# Patient Record
Sex: FEMALE | Race: WHITE | NOT HISPANIC OR LATINO | Employment: FULL TIME | URBAN - METROPOLITAN AREA
[De-identification: names, ages, dates, MRNs, and addresses within clinical notes are randomized per-mention and may not be internally consistent; named-entity substitution may affect disease eponyms.]

---

## 2017-06-30 ENCOUNTER — ALLSCRIPTS OFFICE VISIT (OUTPATIENT)
Dept: OTHER | Facility: OTHER | Age: 47
End: 2017-06-30

## 2017-06-30 DIAGNOSIS — Z12.31 ENCOUNTER FOR SCREENING MAMMOGRAM FOR MALIGNANT NEOPLASM OF BREAST: ICD-10-CM

## 2017-10-18 ENCOUNTER — HOSPITAL ENCOUNTER (OUTPATIENT)
Dept: MAMMOGRAPHY | Facility: MEDICAL CENTER | Age: 47
Discharge: HOME | End: 2017-10-18
Payer: COMMERCIAL

## 2017-10-18 DIAGNOSIS — Z12.31 ENCOUNTER FOR SCREENING MAMMOGRAM FOR MALIGNANT NEOPLASM OF BREAST: ICD-10-CM

## 2017-10-18 PROCEDURE — G0202 SCR MAMMO BI INCL CAD: HCPCS

## 2017-12-22 ENCOUNTER — ALLSCRIPTS OFFICE VISIT (OUTPATIENT)
Dept: OTHER | Facility: OTHER | Age: 47
End: 2017-12-22

## 2017-12-23 NOTE — PROGRESS NOTES
Assessment   1  Monilial vulvovaginitis (112 1) (B37 3)   2  Vulvar cyst (624 8) (N90 7)    Plan   Monilial vulvovaginitis    · Nystatin [id number] UNIT/GM External Powder; APPLY 2-3 TIMES DAILY TO AFFECTED    AREA(S)   Rx By: Shay Berman; Dispense: 0 Days ; #:1 X 60 GM Bottle; Refill: 1;For: Monilial vulvovaginitis; EPIFANIO = N; Sent To: [id number] Vulvar cyst    · I&D Vulva/Perineal Absces - POC; Status:Complete;   Done: [id number]   Perform: In Office; [id number]; Ordered; For:Vulvar cyst; Ordered By:Khadra Castorena;    Discussion/Summary   Discussion Summary:    Vulvar sebaceous cyst evacuated for patient comfort in 2 weeks if no improvement with nystatin powder for vulvar yeast infection  Goals and Barriers: The patient has the current Goals: Resolution of sx  The patent has the current Barriers: None  Patient's Capacity to Self-Care: Patient is able to Self-Care  Chief Complaint   Chief Complaint Free Text Note Form: I have a lump      History of Present Illness   HPI: Pt is a 41 y o   with LMP 2017 who presents complaining of a lump on her left labia  She reports it started 1 week ago and she has been using warm compresses and has not shaved for 1 week b/c she thought she had an ingrown hair  She reports the lump is larger than when it first started  She reports it was pea size and now it is the size of a dime  She reports it feels hard and not fluctuant  She denies any drainage from it  She denies fevers/chills  SHe reports it is painful to wear pants b/c it rubs against her pants  She reports it is painful to sit  She reports she has not needed anything for pain  SHe has not been sexually active for 2 weeks  SHe is afraid that intercourse will be painful  She also reports that she also noticed that her perineum is very itchy as well   She denies any abnormal vaginal discharge      Review of Systems   Focused-Female:      Constitutional: No fever, no chills, feels well, no tiredness, no recent weight gain or loss-- and-- as noted in HPI       ENT: no ear ache, no loss of hearing, no nosebleeds or nasal discharge, no sore throat or hoarseness  Cardiovascular: no complaints of slow or fast heart rate, no chest pain, no palpitations, no leg claudication or lower extremity edema  Respiratory: no complaints of shortness of breath, no wheezing, no dyspnea on exertion, no orthopnea or PND  Breasts: no complaints of breast pain, breast lump or nipple discharge  Gastrointestinal: no complaints of abdominal pain, no constipation, no nausea or diarrhea, no vomiting, no bloody stools,-- no nausea,-- no vomiting,-- no constipation-- and-- no diarrhea  Genitourinary: as noted in HPI,-- no dysuria,-- no vaginal discharge,-- no incontinence,-- no dysmenorrhea-- and-- no unexplained vaginal bleeding  Musculoskeletal: no complaints of arthralgia, no myalgia, no joint swelling or stiffness, no limb pain or swelling  Integumentary: as noted in HPI  Neurological: no complaints of headache, no confusion, no numbness or tingling, no dizziness or fainting  Active Problems   1  Lack of exercise (V69 0) (Z72 3)   2  Morbid obesity (278 01) (E66 01)   3  Visit for screening mammogram (V76 12) (Z12 31)   4  Well female exam with routine gynecological exam (V72 31) (Z01 419)    Past Medical History   1  Denied: History of Herpes simplex   2  History of abnormal cervical Pap smear (V13 29) (V82 254)   3  History of lump of right breast (V13 89) (Z87 898)   4  History of Papanicolaou smear (V45 89) (Z98 890)   5  History of pregnancy (V13 29)   6  History of Irritable bowel syndrome (IBS) (564 1) (K58 9)   7  History of Menorrhagia with irregular cycle (626 2) (N92 1)   8  History of Varicella (052 9) (B01 9)  Active Problems And Past Medical History Reviewed: The active problems and past medical history were reviewed and updated today  Surgical History   1   History of Cervix Cryosurgery   2  History of Oral Surgery Tooth Extraction  Surgical History Reviewed: The surgical history was reviewed and updated today  Family History   Mother    1  Family history of breast cancer (V16 3) (Z80 3)   2  Family history of Hypothyroidism  Father    3  Family history of malignant neoplasm of urinary bladder (V16 52) (Z80 52)   4  Family history of Hypercholesterolemia   5  Family history of Prostate cancer   6  Family history of Thyroid disorder  Maternal Grandmother    7  Family history of Cardiac ischemia   8  Family history of    5  Family history of Hypertension   10  Family history of Stroke  Paternal Grandmother    6  Family history of Cervical cancer   12  Family history of    15  Family history of Liver cancer  Maternal Aunt    15  Family history of Type 2 diabetes mellitus  Family History Reviewed: The family history was reviewed and updated today  Social History    · Alcohol use   · Always uses seat belt   · Education history   · Inadequate exercise (V69 0) (Z72 3)   · Lack of exercise (V69 0) (Z72 3)   ·    · Never a smoker   · No drug use   · No preference on Gnosticism beliefs   · Occupation   · One child   · Primary spoken language English   · Racial background  Social History Reviewed: The social history was reviewed and updated today  The social history was reviewed and is unchanged  Current Meds    1  Multi-Vitamin Oral Tablet; Therapy: (Recorded:[id number]) to Recorded  Medication List Reviewed: The medication list was reviewed and updated today  Allergies   1  Macrobid   2  Penicillins    Vitals   Vital Signs    Recorded: [id number] 34:38OI   Systolic 305   Diastolic 78   Height 5 ft 2 76 in   Weight 203 lb    BMI Calculated 36 24   BSA Calculated 1 94   LMP [id number]     Physical Exam        Constitutional      General appearance: Abnormal   morbidly obese  Neck      Neck: Normal, supple, trachea midline, no masses  Pulmonary      Respiratory effort: No increased work of breathing or signs of respiratory distress  Genitourinary      External genitalia: Abnormal  -- (right labia minora enlarged with sebaceous cyst--cyst evacuated) There was erythema of the labia majora  Examination of the external genitalia showed vulvitis  There was no erythema, no ecchymosis and no swelling of the labia minora  Both Bartholin's glands were normal  The clitoris was normal       Vagina: Normal, no lesions or dryness appreciated  Urethra: Normal        Urethral meatus: Normal        Bladder: Normal, soft, non-tender and no prolapse or masses appreciated  Cervix: Normal, no palpable masses  Examination of the cervix revealed normal findings,-- no polyps-- and-- no masses  A Pap smear was not performed  Uterus: Normal, non-tender, not enlarged, and no palpable masses  Adnexa/parametria: Normal, non-tender and no fullness or masses appreciated  Abdomen      Abdomen: Normal, non-tender, and no organomegaly noted  Liver and spleen: No hepatomegaly or splenomegaly  Examination for hernias: No hernias appreciated  Skin      Skin and subcutaneous tissue: Normal skin turgor and no rashes         Psychiatric      Orientation to person, place, and time: Normal        Mood and affect: Normal        Signatures    Electronically signed by : LUISITO Bingham ; Dec 22 2017  8:58AM EST                       (Author)

## 2018-01-13 VITALS
HEIGHT: 63 IN | WEIGHT: 199 LBS | DIASTOLIC BLOOD PRESSURE: 70 MMHG | BODY MASS INDEX: 35.26 KG/M2 | SYSTOLIC BLOOD PRESSURE: 110 MMHG

## 2018-01-23 VITALS
WEIGHT: 203 LBS | BODY MASS INDEX: 35.97 KG/M2 | DIASTOLIC BLOOD PRESSURE: 78 MMHG | HEIGHT: 63 IN | SYSTOLIC BLOOD PRESSURE: 124 MMHG

## 2019-01-28 ENCOUNTER — ANNUAL EXAM (OUTPATIENT)
Dept: OBGYN CLINIC | Facility: CLINIC | Age: 49
End: 2019-01-28
Payer: COMMERCIAL

## 2019-01-28 VITALS
BODY MASS INDEX: 35.58 KG/M2 | DIASTOLIC BLOOD PRESSURE: 70 MMHG | SYSTOLIC BLOOD PRESSURE: 124 MMHG | HEIGHT: 63 IN | WEIGHT: 200.8 LBS

## 2019-01-28 DIAGNOSIS — Z12.31 ENCOUNTER FOR SCREENING MAMMOGRAM FOR BREAST CANCER: ICD-10-CM

## 2019-01-28 DIAGNOSIS — Z01.419 ENCOUNTER FOR ANNUAL ROUTINE GYNECOLOGICAL EXAMINATION: Primary | ICD-10-CM

## 2019-01-28 DIAGNOSIS — E66.9 OBESITY, CLASS II, BMI 35-39.9: ICD-10-CM

## 2019-01-28 DIAGNOSIS — Z12.4 PAP SMEAR FOR CERVICAL CANCER SCREENING: ICD-10-CM

## 2019-01-28 PROBLEM — E66.812 OBESITY, CLASS II, BMI 35-39.9: Status: ACTIVE | Noted: 2019-01-28

## 2019-01-28 PROCEDURE — S0612 ANNUAL GYNECOLOGICAL EXAMINA: HCPCS | Performed by: OBSTETRICS & GYNECOLOGY

## 2019-01-28 RX ORDER — DIPHENOXYLATE HYDROCHLORIDE AND ATROPINE SULFATE 2.5; .025 MG/1; MG/1
1 TABLET ORAL
COMMUNITY
End: 2021-09-08

## 2019-01-28 RX ORDER — UBIDECARENONE 75 MG
CAPSULE ORAL DAILY
COMMUNITY
End: 2020-11-11

## 2019-01-28 RX ORDER — OMEGA-3S/DHA/EPA/FISH OIL/D3 300MG-1000
400 CAPSULE ORAL DAILY
COMMUNITY
End: 2021-09-08

## 2019-01-28 NOTE — PROGRESS NOTES
Pt is a 57 y o  No obstetric history on file  with Patient's last menstrual period was 2018 (exact date)  using VL for [facility] presents for preventive care  She notes the same partner since her last STI evaluation  In her lifetime she has been involved with 5   Safe sexual practices (monogomy, condoms) are followed consistently  · She does  feel safe in the relationship  She does feel safe in her home  · Her calcium intake encompasses milk (cow, goat, almond, cashew, soy, etc), cheese and yogurt for a total of 5-6 servings daily on average  She does take additional Vitamin D (MVI or supplement)  · She exercises 6-7 times per week  · Her menses occur every 21-60  Days, last 4-6 days and require super tampons every 4 hours for 3 days and then lighter hours  Menstrual History:  OB History      Para Term  AB Living    1 1   1   1    SAB TAB Ectopic Multiple Live Births                     Obstetric Comments    35 week , PPROM    Menarche: 14    Menses: 21-60/4-6/super tampon every 4 hours for 3 days then lighter         Menarche age: 17  Patient's last menstrual period was 2018 (exact date)  ·      · She has never recieved an HPV vaccine    · tobacco use : does not use tobacco              · Colonoscopy: 2019--repeat   · Last pap: 3/2016--wnl, repeat today  · Mammogram: --wnl, rx given    Past Medical History:   Diagnosis Date    Abnormal Pap smear of cervix     ; wnl since; 3/2016-wnl, HPV neg; 2019    Asthma     HPV (human papilloma virus) infection     Migraine     Varicella        Past Surgical History:   Procedure Laterality Date    COLONOSCOPY      2019-benign polyps; 2024    WISDOM TOOTH EXTRACTION         OB History    Para Term  AB Living   1 1   1   1   SAB TAB Ectopic Multiple Live Births                  # Outcome Date GA Lbr Dinesh/2nd Weight Sex Delivery Anes PTL Lv   1                Obstetric Comments   35 week , PPROM      Menarche: 14      Menses: 21-60/4-6/super tampon every 4 hours for 3 days then lighter       Gyn HX:  abnormal pap       Current Outpatient Prescriptions:     cholecalciferol (VITAMIN D3) 400 units tablet, Take 400 Units by mouth daily, Disp: , Rfl:     cyanocobalamin (VITAMIN B-12) 100 mcg tablet, Take by mouth daily, Disp: , Rfl:     multivitamin (THERAGRAN) TABS, Take 1 tablet by mouth, Disp: , Rfl:     albuterol (PROVENTIL HFA,VENTOLIN HFA) 90 mcg/act inhaler, Inhale 2 puffs every 4 (four) hours as needed, Disp: , Rfl:     Allergies   Allergen Reactions    Nitrofurantoin Nausea Only, Vomiting and Hives     N/V    Pollen Extract     Penicillins Rash       Social History     Social History    Marital status: /Civil Union     Spouse name: Jim Rich Number of children: 1    Years of education: bachelors     Occupational History    Transportation       Social History Main Topics    Smoking status: Never Smoker    Smokeless tobacco: Never Used    Alcohol use 1 2 oz/week     2 Glasses of wine per week    Drug use: No    Sexual activity: Yes     Partners: Male     Birth control/ protection: Male Sterilization      Comment: lifetime partners: 5;  5 years     Other Topics Concern    None     Social History Narrative    Catholic: No preference    Accepts blood products        Exercise: walking daily x 30 min    Calcium: 2 c milk daily, 2 cheese daily, 1 yogurt 4x/week           Family History   Problem Relation Age of Onset    Breast cancer Mother 72    Obesity Mother     Prostate cancer Father     Cancer Father         bladder    Cervical cancer Paternal Grandmother     Lung cancer Paternal Grandfather     Ovarian cancer Neg Hx     Colon cancer Neg Hx        Blood pressure 124/70, height 5' 3" (1 6 m), weight 91 1 kg (200 lb 12 8 oz), last menstrual period 2018  and Body mass index is 35 57 kg/m²      Physical Exam   Constitutional: She is oriented to person, place, and time  She appears well-developed and well-nourished  HENT:   Head: Normocephalic and atraumatic  Eyes: Conjunctivae and EOM are normal    Neck: Normal range of motion  Neck supple  No tracheal deviation present  No thyromegaly present  Cardiovascular: Normal rate, regular rhythm and normal heart sounds  Pulmonary/Chest: Effort normal and breath sounds normal  No stridor  No respiratory distress  She has no wheezes  She has no rales  Abdominal: Soft  Bowel sounds are normal  She exhibits no distension and no mass  There is no tenderness  There is no rebound and no guarding  Musculoskeletal: Normal range of motion  She exhibits no edema or tenderness  Lymphadenopathy:     She has no cervical adenopathy  Neurological: She is alert and oriented to person, place, and time  Skin: Skin is warm  No rash noted  No erythema  Psychiatric: She has a normal mood and affect  Her behavior is normal  Judgment and thought content normal      Breasts: breasts appear normal, no suspicious masses, no skin or nipple changes or axillary nodes, symmetric fibrous changes in both upper outer quadrants  vulva: normal external genitalia for age and no lesions, masses, epithelial changes, or exudate  vagina: color pink, rugae  well formed rugae and discharge  white  cervix: parous and no lesions   uterus: NSSC, AF, NT, mobile  adnexa: no masses or tenderness  rectum: no masses or nodularity      A/P:  Pt is a 57 y o  No obstetric history on file  with      Diagnoses and all orders for this visit:    Encounter for annual routine gynecological examination    Encounter for screening mammogram for breast cancer  -     Mammo screening bilateral w cad; Future    Pap smear for cervical cancer screening  -     Thinprep Tis Pap and HPV mRNA E6/E7 Reflex HPV 16,18/45    Obesity, Class II, BMI 35-39 9    Patient advised recommendation of daily dietary calcium of  1000 mg calcium     Patient advised recommendation of exercise 5 times per week for 30 minutes  Patient advised recommendation of BMI to be between 19-25      Pt up to date on colonoscopy

## 2019-03-11 ENCOUNTER — HOSPITAL ENCOUNTER (OUTPATIENT)
Dept: MAMMOGRAPHY | Facility: MEDICAL CENTER | Age: 49
Discharge: HOME | End: 2019-03-11
Payer: COMMERCIAL

## 2019-03-11 DIAGNOSIS — Z12.31 ENCOUNTER FOR SCREENING MAMMOGRAM FOR BREAST CANCER: ICD-10-CM

## 2019-03-11 PROCEDURE — 77067 SCR MAMMO BI INCL CAD: CPT

## 2020-08-13 ENCOUNTER — ANNUAL EXAM (OUTPATIENT)
Dept: OBGYN CLINIC | Facility: CLINIC | Age: 50
End: 2020-08-13
Payer: COMMERCIAL

## 2020-08-13 VITALS
SYSTOLIC BLOOD PRESSURE: 118 MMHG | BODY MASS INDEX: 33.9 KG/M2 | DIASTOLIC BLOOD PRESSURE: 78 MMHG | TEMPERATURE: 97.8 F | HEIGHT: 64 IN | WEIGHT: 198.6 LBS

## 2020-08-13 DIAGNOSIS — Z12.31 ENCOUNTER FOR SCREENING MAMMOGRAM FOR BREAST CANCER: ICD-10-CM

## 2020-08-13 DIAGNOSIS — Z72.3 INADEQUATE EXERCISE: ICD-10-CM

## 2020-08-13 DIAGNOSIS — Z12.11 COLON CANCER SCREENING: ICD-10-CM

## 2020-08-13 DIAGNOSIS — Z01.419 ENCOUNTER FOR ANNUAL ROUTINE GYNECOLOGICAL EXAMINATION: Primary | ICD-10-CM

## 2020-08-13 DIAGNOSIS — N93.9 ABNORMAL UTERINE BLEEDING: ICD-10-CM

## 2020-08-13 PROCEDURE — S0612 ANNUAL GYNECOLOGICAL EXAMINA: HCPCS | Performed by: OBSTETRICS & GYNECOLOGY

## 2020-08-13 NOTE — PROGRESS NOTES
Pt is a 52 y o   with Patient's last menstrual period was 07/10/2020 (exact date)  using VL for [facility] presents for preventive care  She notes the same partner since her last STI evaluation  In her lifetime she has been involved with 5   Safe sexual practices (monogomy, condoms) are followed consistently  · She does  feel safe in the relationship  She does feel safe in her home  · Her calcium intake encompasses milk (cow, goat, almond, cashew, soy, etc), cheese and yogurt for a total of 5-6 servings daily on average  She does take additional Vitamin D (MVI or supplement)  · She exercises 3-4 times per week  · Her menses occur every 14-60  Days, last 6-7 days and require super tampons every 4-8 hours  Menstrual History:  OB History        1    Para   1    Term   0       1    AB        Living   1       SAB        TAB        Ectopic        Multiple        Live Births               Obstetric Comments   35 week , PPROM    Menarche: 14    Menses: 14-60/7/super tampon every 4 hours for 3 days then lighter - unpredictable flow            Menarche age: 17  Patient's last menstrual period was 07/10/2020 (exact date)  ·      · She has never recieved an HPV vaccine  · tobacco use : does not use tobacco              · Colonoscopy: 2019-repeat , hemoccult rx given  · Mammogram: 3/2019-wnl, repeat rx given  · Pap: 2019-wnl, HRHPV neg, repeat   · Reviewed with patient that her bleeding profile is concerning now that she has cycles as short as 14 days recommend workup including labs, u/s and eb  Pt agrees       Past Medical History:   Diagnosis Date    Abnormal Pap smear of cervix     ; wnl since; 3/2016-wnl, HPV neg; 2019 wnl, HPV neg    Asthma     HPV (human papilloma virus) infection     Migraine     Varicella        Past Surgical History:   Procedure Laterality Date    COLONOSCOPY      2019-benign polyps; 2024    WISDOM TOOTH EXTRACTION         OB History    Para Term  AB Living   1 1 0 1   1   SAB TAB Ectopic Multiple Live Births                  # Outcome Date GA Lbr Dinesh/2nd Weight Sex Delivery Anes PTL Lv   1                Obstetric Comments   35 week , PPROM      Menarche: 14      Menses: 14-60/7/super tampon every 4 hours for 3 days then lighter - unpredictable flow            Current Outpatient Medications:     albuterol (PROVENTIL HFA,VENTOLIN HFA) 90 mcg/act inhaler, Inhale 2 puffs every 4 (four) hours as needed, Disp: , Rfl:     cholecalciferol (VITAMIN D3) 400 units tablet, Take 400 Units by mouth daily, Disp: , Rfl:     cyanocobalamin (VITAMIN B-12) 100 mcg tablet, Take by mouth daily, Disp: , Rfl:     multivitamin (THERAGRAN) TABS, Take 1 tablet by mouth, Disp: , Rfl:     Allergies   Allergen Reactions    Nitrofurantoin Nausea Only, Vomiting and Hives     N/V    Pollen Extract     Penicillins Rash       Social History     Socioeconomic History    Marital status: /Civil Union     Spouse name: Kami Logn Number of children: 1    Years of education: bachelors    Highest education level: None   Occupational History    Occupation: Transportation    Social Needs    Financial resource strain: None    Food insecurity     Worry: None     Inability: None    Transportation needs     Medical: None     Non-medical: None   Tobacco Use    Smoking status: Never Smoker    Smokeless tobacco: Never Used   Substance and Sexual Activity    Alcohol use:  Yes     Alcohol/week: 2 0 standard drinks     Types: 2 Glasses of wine per week     Frequency: 2-3 times a week     Drinks per session: 1 or 2    Drug use: No    Sexual activity: Yes     Partners: Male     Birth control/protection: Male Sterilization     Comment: lifetime partners: 5;  5 years   Lifestyle    Physical activity     Days per week: None     Minutes per session: None    Stress: None   Relationships    Social connections     Talks on phone: None     Gets together: None     Attends Shinto service: None     Active member of club or organization: None     Attends meetings of clubs or organizations: None     Relationship status: None    Intimate partner violence     Fear of current or ex partner: None     Emotionally abused: None     Physically abused: None     Forced sexual activity: None   Other Topics Concern    None   Social History Narrative    Taoism: No preference    Accepts blood products        Exercise: 3x/week x 30 min    Calcium: multivitamin, 2 c milk daily, 2 cheese daily, 1 yogurt 4x/week       Family History   Problem Relation Age of Onset    Breast cancer Mother 72    Obesity Mother     Prostate cancer Father 62    Cancer Father 64        bladder    Heart disease Maternal Grandmother     Diabetes Maternal Grandmother     Heart disease Maternal Grandfather     Cervical cancer Paternal Grandmother         age at onset unknown    Ovarian cancer Paternal Grandmother         age at onset unknown,  29?  Lung cancer Paternal Grandfather [de-identified]    Colon cancer Neg Hx        Blood pressure 118/78, temperature 97 8 °F (36 6 °C), height 5' 3 5" (1 613 m), weight 90 1 kg (198 lb 9 6 oz), last menstrual period 07/10/2020  and Body mass index is 34 63 kg/m²  Physical Exam  Constitutional:       Appearance: She is well-developed  HENT:      Head: Normocephalic and atraumatic  Eyes:      Conjunctiva/sclera: Conjunctivae normal    Neck:      Musculoskeletal: Normal range of motion and neck supple  Thyroid: No thyromegaly  Trachea: No tracheal deviation  Cardiovascular:      Rate and Rhythm: Normal rate and regular rhythm  Heart sounds: Normal heart sounds  Pulmonary:      Effort: Pulmonary effort is normal  No respiratory distress  Breath sounds: Normal breath sounds  No stridor  No wheezing or rales  Abdominal:      General: Bowel sounds are normal  There is no distension        Palpations: Abdomen is soft  There is no mass  Tenderness: There is no abdominal tenderness  There is no guarding or rebound  Musculoskeletal: Normal range of motion  General: No tenderness  Lymphadenopathy:      Cervical: No cervical adenopathy  Skin:     General: Skin is warm  Findings: No erythema or rash  Neurological:      Mental Status: She is alert and oriented to person, place, and time  Psychiatric:         Behavior: Behavior normal          Thought Content: Thought content normal          Judgment: Judgment normal          Breasts: breasts appear normal, no suspicious masses, no skin or nipple changes or axillary nodes, symmetric fibrous changes in both upper outer quadrants  vulva: normal external genitalia for age and no lesions, masses, epithelial changes, or exudate  vagina: color pink and rugae  well formed rugae  cervix: parous and no lesions   uterus: NSSC, AF, NT, mobile  adnexa: no masses or tenderness  rectum: no masses or nodularity      A/P:  Pt is a 52 y o  [id number] with      Diagnoses and all orders for this visit:    Encounter for annual routine gynecological examination  -pap up to date    Encounter for screening mammogram for breast cancer  -     Mammo screening bilateral w cad; Future    Colon cancer screening  -     Occult Blood, Fecal Immunochemical; Future    Abnormal uterine bleeding  -     TSH, 3rd generation with Free T4 reflex; Future  -     CBC; Future  -     Follicle stimulating hormone; Future  -     Luteinizing hormone; Future  -     Estradiol; Future  -     Prolactin; Future  -     US pelvis complete w transvaginal; Future  -will follow up for EBx    BMI 34 0-34 9,adult  Patient advised recommendation of BMI to be between 19-25  Inadequate exercise  Patient advised recommendation of exercise 5 times per week for 30 minutes

## 2020-09-29 ENCOUNTER — PROCEDURE VISIT (OUTPATIENT)
Dept: OBGYN CLINIC | Facility: CLINIC | Age: 50
End: 2020-09-29
Payer: COMMERCIAL

## 2020-09-29 VITALS
TEMPERATURE: 97.5 F | BODY MASS INDEX: 33.09 KG/M2 | WEIGHT: 193.8 LBS | OXYGEN SATURATION: 98 % | SYSTOLIC BLOOD PRESSURE: 112 MMHG | DIASTOLIC BLOOD PRESSURE: 72 MMHG | HEART RATE: 83 BPM | HEIGHT: 64 IN

## 2020-09-29 DIAGNOSIS — N93.9 ABNORMAL UTERINE BLEEDING: Primary | ICD-10-CM

## 2020-09-29 PROCEDURE — 58100 BIOPSY OF UTERUS LINING: CPT | Performed by: OBSTETRICS & GYNECOLOGY

## 2020-09-29 NOTE — PROGRESS NOTES
Endometrial biopsy    Date/Time: 9/29/2020 12:48 PM  Performed by: Brie Dow MD  Authorized by: Brie Dow MD     Consent:     Consent obtained:  Written    Consent given by:  Patient    Procedural risks discussed:  Bleeding, failure rate, infection, possible continued pain, repeat procedure and damage to other organs    Patient questions answered: yes      Patient agrees, verbalizes understanding, and wants to proceed: yes      Educational handouts given: yes      Instructions and paperwork completed: yes    Indication:     Indications:  Other disorder of menstruation and other abnormal bleeding from female genital tract    Procedure:     Procedure: endometrial biopsy with Pipelle      A bivalve speculum was placed in the vagina: yes      Cervix cleaned and prepped: yes      A paracervical block was performed: no      An intracervical block was performed: no      The cervix was dilated: no      Uterus sounded: yes      Uterus sound depth (cm):  8 5    Specimen collected: specimen collected and sent to pathology      Patient tolerated procedure well with no complications: yes    Findings:     Uterus size:  Non-gravid    Cervix: normal

## 2020-10-07 ENCOUNTER — HOSPITAL ENCOUNTER (OUTPATIENT)
Dept: ULTRASOUND IMAGING | Facility: HOSPITAL | Age: 50
Discharge: HOME | End: 2020-10-07
Payer: COMMERCIAL

## 2020-10-07 ENCOUNTER — APPOINTMENT (OUTPATIENT)
Dept: MAMMOGRAPHY | Facility: HOSPITAL | Age: 50
End: 2020-10-07
Payer: COMMERCIAL

## 2020-10-07 ENCOUNTER — HOSPITAL ENCOUNTER (OUTPATIENT)
Dept: MAMMOGRAPHY | Facility: HOSPITAL | Age: 50
Discharge: HOME | End: 2020-10-07
Payer: COMMERCIAL

## 2020-10-07 VITALS — BODY MASS INDEX: 34.2 KG/M2 | WEIGHT: 193 LBS | HEIGHT: 63 IN

## 2020-10-07 DIAGNOSIS — N93.9 ABNORMAL UTERINE BLEEDING: ICD-10-CM

## 2020-10-07 DIAGNOSIS — Z12.31 ENCOUNTER FOR SCREENING MAMMOGRAM FOR BREAST CANCER: ICD-10-CM

## 2020-10-07 PROCEDURE — 77063 BREAST TOMOSYNTHESIS BI: CPT

## 2020-10-07 PROCEDURE — 77067 SCR MAMMO BI INCL CAD: CPT

## 2020-10-07 PROCEDURE — 76856 US EXAM PELVIC COMPLETE: CPT

## 2020-10-07 PROCEDURE — 76830 TRANSVAGINAL US NON-OB: CPT

## 2020-11-11 ENCOUNTER — OFFICE VISIT (OUTPATIENT)
Dept: OBGYN CLINIC | Facility: CLINIC | Age: 50
End: 2020-11-11
Payer: COMMERCIAL

## 2020-11-11 VITALS
TEMPERATURE: 98.3 F | WEIGHT: 198.4 LBS | DIASTOLIC BLOOD PRESSURE: 84 MMHG | HEART RATE: 84 BPM | SYSTOLIC BLOOD PRESSURE: 112 MMHG | OXYGEN SATURATION: 97 % | BODY MASS INDEX: 35.14 KG/M2

## 2020-11-11 DIAGNOSIS — N93.9 ABNORMAL UTERINE BLEEDING: Primary | ICD-10-CM

## 2020-11-11 PROCEDURE — 99213 OFFICE O/P EST LOW 20 MIN: CPT | Performed by: OBSTETRICS & GYNECOLOGY

## 2021-09-08 ENCOUNTER — ANNUAL EXAM (OUTPATIENT)
Dept: OBGYN CLINIC | Facility: CLINIC | Age: 51
End: 2021-09-08
Payer: COMMERCIAL

## 2021-09-08 VITALS
DIASTOLIC BLOOD PRESSURE: 78 MMHG | WEIGHT: 200.4 LBS | HEIGHT: 63 IN | BODY MASS INDEX: 35.51 KG/M2 | SYSTOLIC BLOOD PRESSURE: 122 MMHG

## 2021-09-08 DIAGNOSIS — Z72.3 INADEQUATE EXERCISE: ICD-10-CM

## 2021-09-08 DIAGNOSIS — Z01.419 ENCOUNTER FOR ANNUAL ROUTINE GYNECOLOGICAL EXAMINATION: Primary | ICD-10-CM

## 2021-09-08 DIAGNOSIS — Z12.31 ENCOUNTER FOR SCREENING MAMMOGRAM FOR BREAST CANCER: ICD-10-CM

## 2021-09-08 PROBLEM — K76.0 FATTY LIVER: Status: ACTIVE | Noted: 2020-06-10

## 2021-09-08 PROCEDURE — S0612 ANNUAL GYNECOLOGICAL EXAMINA: HCPCS | Performed by: OBSTETRICS & GYNECOLOGY

## 2021-09-08 RX ORDER — CETIRIZINE HYDROCHLORIDE 10 MG/1
10 TABLET ORAL
COMMUNITY

## 2021-09-08 NOTE — PROGRESS NOTES
Pt is a 54 y o   with Patient's last menstrual period was 08/15/2021  using VL for [facility] presents for preventive care  She notes the same partner since her last STI evaluation  In her lifetime she has been involved with 5   Safe sexual practices (monogomy, condoms) are followed consistently  · She does  feel safe in the relationship  She does feel safe in her home  · Her calcium intake encompasses milk (cow, goat, almond, cashew, soy, etc), cheese and yogurt for a total of 4-5 servings daily on average  She does not take additional Vitamin D (MVI or supplement)  · She exercises 0 times per week  · Her menses occur every month to 9 months, last 6-7 days and require super tampons every 4-5 hours  Menstrual History:  OB History        2    Para   2    Term   1       1    AB        Living   1       SAB        TAB        Ectopic        Multiple        Live Births               Obstetric Comments   35 week , PPROM    Menarche: 14    Menses: every month to  5 months/7/super tampon every 4 hours for 3 days then lighter            Menarche age: 17  Patient's last menstrual period was 08/15/2021  ·      · She has never recieved an HPV vaccine    · tobacco use : does not use tobacco              · colonoscopy : 2019-benign, repeat   · Pap: 2019-wnl, HRHPV neg  · Mammogram: 10/7/2020-wnl, repeat rx given    Past Medical History:   Diagnosis Date    Abnormal Pap smear of cervix     ; wnl since; 3/2016-wnl, HPV neg; 2019 wnl, HPV neg    Asthma     COVID-19     2020    COVID-19 vaccine series completed     J&J-3/19/2021    HPV (human papilloma virus) infection     Migraine     Varicella        Past Surgical History:   Procedure Laterality Date    COLONOSCOPY      2019-benign polyps; 2024    WISDOM TOOTH EXTRACTION         OB History    Para Term  AB Living   2 2 1 1   1   SAB TAB Ectopic Multiple Live Births                  # Outcome Date GA Lbr Dinesh/2nd Weight Sex Delivery Anes PTL Lv   2 Term            1                Obstetric Comments   35 week , PPROM      Menarche: 14      Menses: every month to  9 months/7/super tampon every 4 hours for 3 days then lighter            Current Outpatient Medications:     albuterol (PROVENTIL HFA,VENTOLIN HFA) 90 mcg/act inhaler, Inhale 2 puffs every 4 (four) hours as needed, Disp: , Rfl:     cetirizine (ZyrTEC) 10 mg tablet, Take 10 mg by mouth, Disp: , Rfl:     Allergies   Allergen Reactions    Nitrofurantoin Nausea Only, Vomiting and Hives     N/V    Pollen Extract     Penicillins Rash       Social History     Socioeconomic History    Marital status: /Civil Union     Spouse name: Bennett Roberts Number of children: 1    Years of education: bachelors    Highest education level: None   Occupational History    Occupation: Transportation    Tobacco Use    Smoking status: Never Smoker    Smokeless tobacco: Never Used   Vaping Use    Vaping Use: Never used   Substance and Sexual Activity    Alcohol use: Yes     Alcohol/week: 2 0 standard drinks     Types: 2 Glasses of wine per week    Drug use: No    Sexual activity: Yes     Partners: Male     Birth control/protection: Male Sterilization     Comment: lifetime partners: 5;  5 years   Other Topics Concern    None   Social History Narrative    Lutheran: No preference    Accepts blood products        Exercise: none    Calcium: 2 c milk daily, 2 cheese daily, 1 yogurt 4x/week     Social Determinants of Health     Financial Resource Strain:     Difficulty of Paying Living Expenses:    Food Insecurity:     Worried About Running Out of Food in the Last Year:     Ran Out of Food in the Last Year:    Transportation Needs:     Lack of Transportation (Medical):      Lack of Transportation (Non-Medical):    Physical Activity:     Days of Exercise per Week:     Minutes of Exercise per Session:    Stress:     Feeling of Stress :    Social Connections:     Frequency of Communication with Friends and Family:     Frequency of Social Gatherings with Friends and Family:     Attends Christian Services:     Active Member of Clubs or Organizations:     Attends Club or Organization Meetings:     Marital Status:    Intimate Partner Violence:     Fear of Current or Ex-Partner:     Emotionally Abused:     Physically Abused:     Sexually Abused:        Family History   Problem Relation Age of Onset    Breast cancer Mother 72    Obesity Mother     Diabetes Mother     Prostate cancer Father 62    Cancer Father 64        bladder    Heart disease Maternal Grandmother     Diabetes Maternal Grandmother     Heart disease Maternal Grandfather     Cervical cancer Paternal Grandmother         age at onset unknown    Ovarian cancer Paternal Grandmother         age at onset unknown,  29?  Lung cancer Paternal Grandfather [de-identified]    No Known Problems Maternal Aunt     No Known Problems Maternal Aunt     No Known Problems Paternal Aunt     No Known Problems Paternal Aunt     Colon cancer Neg Hx        Blood pressure 122/78, height 5' 3" (1 6 m), weight 90 9 kg (200 lb 6 4 oz), last menstrual period 08/15/2021  and Body mass index is 35 5 kg/m²  Physical Exam  Constitutional:       General: She is not in acute distress  Appearance: Normal appearance  She is well-developed  She is obese  She is not ill-appearing or toxic-appearing  HENT:      Head: Normocephalic and atraumatic  Eyes:      Extraocular Movements: Extraocular movements intact  Conjunctiva/sclera: Conjunctivae normal    Neck:      Thyroid: No thyromegaly  Trachea: No tracheal deviation  Cardiovascular:      Rate and Rhythm: Normal rate and regular rhythm  Heart sounds: Normal heart sounds  Pulmonary:      Effort: Pulmonary effort is normal  No respiratory distress  Breath sounds: Normal breath sounds  No stridor  No wheezing or rales  Abdominal:      General: Bowel sounds are normal  There is no distension  Palpations: Abdomen is soft  There is no mass  Tenderness: There is no abdominal tenderness  There is no guarding or rebound  Hernia: No hernia is present  Musculoskeletal:         General: No tenderness  Normal range of motion  Cervical back: Normal range of motion and neck supple  Lymphadenopathy:      Cervical: No cervical adenopathy  Skin:     General: Skin is warm  Findings: No erythema or rash  Neurological:      Mental Status: She is alert and oriented to person, place, and time  Psychiatric:         Mood and Affect: Mood normal          Behavior: Behavior normal          Thought Content: Thought content normal          Judgment: Judgment normal          Breasts: breasts appear normal, no suspicious masses, no skin or nipple changes or axillary nodes, symmetric fibrous changes in both upper outer quadrants  vulva: normal external genitalia for age and no lesions, masses, epithelial changes, or exudate  vagina: color pink and rugae  well formed rugae  cervix: parous and no lesions   uterus: NSSC, AF, NT, mobile  adnexa: no masses or tenderness  Rectal: no masses    A/P:  Pt is a 54 y o   with      Johanna was seen today for gynecologic exam     Diagnoses and all orders for this visit:    Encounter for annual routine gynecological examination  -pap up to date  colonoscopy up to date    Encounter for screening mammogram for breast cancer  -     Mammo screening bilateral w 3d & cad; Future    Inadequate exercise  Patient advised recommendation of exercise 5 times per week for 30 minutes  BMI 35 0-35 9,adult  Patient advised recommendation of BMI to be between 19-25

## 2022-09-12 ENCOUNTER — ANNUAL EXAM (OUTPATIENT)
Dept: OBGYN CLINIC | Facility: CLINIC | Age: 52
End: 2022-09-12
Payer: COMMERCIAL

## 2022-09-12 VITALS
SYSTOLIC BLOOD PRESSURE: 122 MMHG | DIASTOLIC BLOOD PRESSURE: 68 MMHG | HEIGHT: 63 IN | BODY MASS INDEX: 36.39 KG/M2 | WEIGHT: 205.4 LBS

## 2022-09-12 DIAGNOSIS — Z12.31 ENCOUNTER FOR SCREENING MAMMOGRAM FOR BREAST CANCER: ICD-10-CM

## 2022-09-12 DIAGNOSIS — Z12.4 PAP SMEAR FOR CERVICAL CANCER SCREENING: ICD-10-CM

## 2022-09-12 DIAGNOSIS — Z01.419 ENCOUNTER FOR ANNUAL ROUTINE GYNECOLOGICAL EXAMINATION: Primary | ICD-10-CM

## 2022-09-12 DIAGNOSIS — Z72.3 INADEQUATE EXERCISE: ICD-10-CM

## 2022-09-12 PROCEDURE — S0612 ANNUAL GYNECOLOGICAL EXAMINA: HCPCS | Performed by: OBSTETRICS & GYNECOLOGY

## 2022-09-12 PROCEDURE — G0476 HPV COMBO ASSAY CA SCREEN: HCPCS | Performed by: OBSTETRICS & GYNECOLOGY

## 2022-09-12 PROCEDURE — G0145 SCR C/V CYTO,THINLAYER,RESCR: HCPCS | Performed by: OBSTETRICS & GYNECOLOGY

## 2022-09-12 RX ORDER — ERGOCALCIFEROL 1.25 MG/1
CAPSULE ORAL
COMMUNITY
Start: 2022-08-29

## 2022-09-12 RX ORDER — MECLIZINE HYDROCHLORIDE 25 MG/1
25 TABLET ORAL 3 TIMES DAILY PRN
COMMUNITY
Start: 2022-05-18 | End: 2023-05-18

## 2022-09-12 NOTE — PROGRESS NOTES
Pt is a53 y o  [id number] ,menopausal, who presents for preventive care  Partner same ; lifetime  5         safe sexual practices followed: yes     does feel safe in relationship:yes    Dairy: 2 c milk daily, 2 cheese daily, 1 yogurt 4x/week  Vitamin D: takes supplement  Exercise: once weekly  Pap: 2019-wnl, HRHPV neg; recollected today  Mammogram: 10/7/2020-wnl, repeat rx given  Colonoscopy: 2019-benign polyps; repeat   DEXA: not indicated  safety at home:  yes  tobacco use N       Past Medical History:   Diagnosis Date    Abnormal Pap smear of cervix     ; wnl since; 3/2016-wnl, HPV neg; 2019 wnl, HPV neg; 2022-    Asthma     COVID-19     2020    COVID-19 vaccine series completed     J&J-3/19/2021    H  pylori infection     HPV (human papilloma virus) infection     Migraine     Varicella        Past Surgical History:   Procedure Laterality Date    COLONOSCOPY      2019-benign polyps; 2024    WISDOM TOOTH EXTRACTION         Ob Hx:   OB History    Para Term  AB Living   2 2 1 1   1   SAB IAB Ectopic Multiple Live Births                  # Outcome Date GA Lbr Dinesh/2nd Weight Sex Delivery Anes PTL Lv   2 Term            1                Obstetric Comments   35 week , PPROM      Menarche: 14      Menses: every month to  9 months//super tampon every 4 hours for 3 days then lighter           Current Outpatient Medications:     albuterol (PROVENTIL HFA,VENTOLIN HFA) 90 mcg/act inhaler, Inhale 2 puffs every 4 (four) hours as needed, Disp: , Rfl:     cetirizine (ZyrTEC) 10 mg tablet, Take 10 mg by mouth, Disp: , Rfl:     ergocalciferol (VITAMIN D2) 50,000 units, , Disp: , Rfl:     meclizine (ANTIVERT) 25 mg tablet, Take 25 mg by mouth Three times daily as needed, Disp: , Rfl:     Allergies   Allergen Reactions    Nitrofurantoin Nausea Only, Vomiting and Hives     N/V    Pollen Extract     Penicillins Rash       Social History     Socioeconomic History    Marital status: /Civil Union     Spouse name: Ele Murry Number of children: 1    Years of education: bachelors    Highest education level: None   Occupational History    Occupation: Transportation    Tobacco Use    Smoking status: Never Smoker    Smokeless tobacco: Never Used   Vaping Use    Vaping Use: Never used   Substance and Sexual Activity    Alcohol use: Yes     Alcohol/week: 2 0 standard drinks     Types: 2 Glasses of wine per week    Drug use: No    Sexual activity: Yes     Partners: Male     Birth control/protection: Male Sterilization     Comment: lifetime partners: 11;     Other Topics Concern    None   Social History Narrative    Religious: No preference    Accepts blood products        Exercise: once weekly    Calcium: 2 c milk daily, 2 cheese daily, 1 yogurt 4x/week     Social Determinants of Health     Financial Resource Strain: Not on file   Food Insecurity: Not on file   Transportation Needs: Not on file   Physical Activity: Not on file   Stress: Not on file   Social Connections: Not on file   Intimate Partner Violence: Not on file   Housing Stability: Not on file       Family History   Problem Relation Age of Onset    Breast cancer Mother 72    Obesity Mother     Diabetes Mother     Prostate cancer Father 62    Cancer Father 64        bladder    Hyperthyroidism Father     Heart disease Maternal Grandmother     Diabetes Maternal Grandmother     Heart disease Maternal Grandfather     Cervical cancer Paternal Grandmother         age at onset unknown    Ovarian cancer Paternal Grandmother         age at onset unknown,  29?  Lung cancer Paternal Grandfather [de-identified]    No Known Problems Maternal Aunt     No Known Problems Maternal Aunt     No Known Problems Paternal Aunt     No Known Problems Paternal Aunt     Colon cancer Neg Hx        Blood pressure 122/68, height 5' 3" (1 6 m), weight 93 2 kg (205 lb 6 4 oz)     and Body mass index is 36 38 kg/m²  Physical Exam  Constitutional:       Appearance: Normal appearance  She is well-developed  She is obese  HENT:      Head: Normocephalic and atraumatic  Eyes:      Extraocular Movements: Extraocular movements intact  Conjunctiva/sclera: Conjunctivae normal    Neck:      Thyroid: No thyromegaly  Trachea: No tracheal deviation  Cardiovascular:      Rate and Rhythm: Normal rate and regular rhythm  Heart sounds: Normal heart sounds  Pulmonary:      Effort: Pulmonary effort is normal  No respiratory distress  Breath sounds: Normal breath sounds  No stridor  No wheezing or rales  Abdominal:      General: Bowel sounds are normal  There is no distension  Palpations: Abdomen is soft  There is no mass  Tenderness: There is no abdominal tenderness  There is no guarding or rebound  Hernia: No hernia is present  Musculoskeletal:         General: No tenderness  Normal range of motion  Cervical back: Normal range of motion and neck supple  Lymphadenopathy:      Cervical: No cervical adenopathy  Skin:     General: Skin is warm  Findings: No erythema or rash  Neurological:      Mental Status: She is alert and oriented to person, place, and time  Psychiatric:         Mood and Affect: Mood normal          Behavior: Behavior normal          Thought Content: Thought content normal          Judgment: Judgment normal           Breasts: breasts appear normal, no suspicious masses, no skin or nipple changes or axillary nodes, symmetric fibrous changes in both upper outer quadrants      vulva: normal external genitalia for age and no lesions, masses, epithelial changes, or exudate  vagina: color pink and rugae  well formed rugae  cervix: parous, no lesions  and pap obtained  uterus: limited by habitus and NSSC, AF, NT, mobile  adnexa: limited by habitus and no masses or tenderness  rectum: no masses or nodularity    A/P:  Pt is a 50 y o  [id number] with       Johanna was seen today for gynecologic exam     Diagnoses and all orders for this visit:    Encounter for annual routine gynecological examination  -pap updated  -encouraged mammography--pt missed last year and mother has a personal h o breast ca  -colonoscopy up to date    Encounter for screening mammogram for breast cancer  -     Mammo screening bilateral w 3d & cad; Future    Pap smear for cervical cancer screening  -     Liquid-based pap, screening    Inadequate exercise  Patient advised recommendation of exercise 5 times per week for 30 minutes  BMI 36 0-36 9,adult  Patient advised recommendation of BMI to be between 19-25

## 2022-09-13 LAB
HPV HR 12 DNA CVX QL NAA+PROBE: NEGATIVE
HPV16 DNA CVX QL NAA+PROBE: NEGATIVE
HPV18 DNA CVX QL NAA+PROBE: NEGATIVE

## 2022-09-20 LAB
LAB AP GYN PRIMARY INTERPRETATION: NORMAL
Lab: NORMAL

## 2022-12-21 ENCOUNTER — HOSPITAL ENCOUNTER (OUTPATIENT)
Dept: MAMMOGRAPHY | Facility: CLINIC | Age: 52
Discharge: HOME | End: 2022-12-21

## 2022-12-21 VITALS — BODY MASS INDEX: 36.32 KG/M2 | WEIGHT: 205 LBS | HEIGHT: 63 IN

## 2022-12-21 DIAGNOSIS — Z12.31 ENCOUNTER FOR SCREENING MAMMOGRAM FOR BREAST CANCER: ICD-10-CM

## 2023-10-04 ENCOUNTER — ANNUAL EXAM (OUTPATIENT)
Dept: OBGYN CLINIC | Facility: CLINIC | Age: 53
End: 2023-10-04
Payer: COMMERCIAL

## 2023-10-04 VITALS
WEIGHT: 206 LBS | DIASTOLIC BLOOD PRESSURE: 76 MMHG | SYSTOLIC BLOOD PRESSURE: 114 MMHG | BODY MASS INDEX: 36.5 KG/M2 | HEIGHT: 63 IN

## 2023-10-04 DIAGNOSIS — Z01.419 ENCOUNTER FOR ANNUAL ROUTINE GYNECOLOGICAL EXAMINATION: Primary | ICD-10-CM

## 2023-10-04 DIAGNOSIS — Z12.11 COLON CANCER SCREENING: ICD-10-CM

## 2023-10-04 DIAGNOSIS — Z72.3 INADEQUATE EXERCISE: ICD-10-CM

## 2023-10-04 DIAGNOSIS — Z12.31 ENCOUNTER FOR SCREENING MAMMOGRAM FOR BREAST CANCER: ICD-10-CM

## 2023-10-04 PROCEDURE — S0612 ANNUAL GYNECOLOGICAL EXAMINA: HCPCS | Performed by: OBSTETRICS & GYNECOLOGY

## 2023-10-04 NOTE — PROGRESS NOTES
Pt is a52 y.o. [id number] ,menopausal, who presents for preventive care  Partner same ; lifetime  5         safe sexual practices followed: yes     does feel safe in relationship:yes    Dairy: 2 c milk daily, 2 cheese daily, 1 yogurt 4x/week  Vitamin D: supplement  Exercise: walking 5x/week x 15 minutes  Pap: 2022-wnl, HRHPV neg, repeat   Mammogram: 2022-wnl, repeat rx given  Colonoscopy: 2019-benign polyps, repeat 2024-referral given  DEXA: not indicated  safety at home:  yes  tobacco use no    Past Medical History:   Diagnosis Date   • Abnormal Pap smear of cervix     ; wnl since; 3/2016-wnl, HPV neg; 2019 wnl, HPV neg; 2022-wnl, HRHPV neg   • Asthma    • COVID-19     2020   • COVID-19 vaccine series completed     J&J-3/19/2021   • H. pylori infection    • HPV (human papilloma virus) infection    • Migraine    • Varicella        Past Surgical History:   Procedure Laterality Date   • COLONOSCOPY      2019-benign polyps; 2024   • WISDOM TOOTH EXTRACTION         Ob Hx:   OB History    Para Term  AB Living   1 1 0 1   1   SAB IAB Ectopic Multiple Live Births                  # Outcome Date GA Lbr Dinesh/2nd Weight Sex Delivery Anes PTL Lv   1                Obstetric Comments   35 week , PPROM      Menarche: 14      Menses: LMP 10/2020           Current Outpatient Medications:   •  albuterol (PROVENTIL HFA,VENTOLIN HFA) 90 mcg/act inhaler, Inhale 2 puffs every 4 (four) hours as needed, Disp: , Rfl:   •  cetirizine (ZyrTEC) 10 mg tablet, Take 10 mg by mouth, Disp: , Rfl:   •  Cholecalciferol 50 MCG (2000 UT) CAPS, Take 1 capsule by mouth, Disp: , Rfl:     Allergies   Allergen Reactions   • Erythromycin Other (See Comments)   • Nitrofurantoin Nausea Only, Vomiting and Hives     N/V   • Pollen Extract    • Penicillins Rash       Social History     Socioeconomic History   • Marital status: /Civil Union     Spouse name: Bahman Massey   • Number of children: 1   • Years of education: bachelors   • Highest education level: None   Occupational History   • 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   • Smoking status: Never   • Smokeless tobacco: Never   Vaping Use   • Vaping Use: Never used   Substance and Sexual Activity   • Alcohol use: Yes     Alcohol/week: 2.0 standard drinks of alcohol     Types: 2 Glasses of wine per week     Comment: Occasionally   • Drug use: No   • Sexual activity: Yes     Partners: Male     Birth control/protection: Male Sterilization, Post-menopausal     Comment: lifetime partners: 5;     Other Topics Concern   • None   Social History Narrative    Samaritan: No preference    Accepts blood products        Exercise: 15 minutes daily walking    Calcium: 2 c milk daily, 2 cheese daily, 1 yogurt 4x/week     Social Determinants of Health     Financial Resource Strain: Not on file   Food Insecurity: Not on file   Transportation Needs: Not on file   Physical Activity: Not on file   Stress: Not on file   Social Connections: Not on file   Intimate Partner Violence: Not on file   Housing Stability: Not on file       Family History   Problem Relation Age of Onset   • Breast cancer Mother 72   • Obesity Mother    • Diabetes Mother    • COPD Mother    • Prostate cancer Father 62   • Cancer Father 64        bladder   • Hyperthyroidism Father    • Other Father 76        Sarcoma in back   • Heart disease Maternal Grandmother    • Diabetes Maternal Grandmother    • Heart disease Maternal Grandfather    • Cervical cancer Paternal Grandmother         age at onset unknown   • Ovarian cancer Paternal Grandmother         age at onset unknown,  29? • Lung cancer Paternal Grandfather 80   • No Known Problems Maternal Aunt    • No Known Problems Maternal Aunt    • No Known Problems Paternal Aunt    • No Known Problems Paternal Aunt    • Colon cancer Neg Hx        Blood pressure 114/76, height 5' 3" (1.6 m), weight 93.4 kg (206 lb).    and Body mass index is 36.49 kg/m². Physical Exam  Constitutional:       General: She is not in acute distress. Appearance: Normal appearance. She is well-developed. She is obese. She is not ill-appearing. HENT:      Head: Normocephalic and atraumatic. Eyes:      Extraocular Movements: Extraocular movements intact. Conjunctiva/sclera: Conjunctivae normal.   Neck:      Thyroid: No thyromegaly. Trachea: No tracheal deviation. Cardiovascular:      Rate and Rhythm: Normal rate and regular rhythm. Heart sounds: Normal heart sounds. Pulmonary:      Effort: Pulmonary effort is normal. No respiratory distress. Breath sounds: Normal breath sounds. No stridor. No wheezing or rales. Abdominal:      General: Bowel sounds are normal. There is no distension. Palpations: Abdomen is soft. There is no mass. Tenderness: There is no abdominal tenderness. There is no guarding or rebound. Hernia: No hernia is present. Musculoskeletal:         General: No tenderness. Normal range of motion. Cervical back: Normal range of motion and neck supple. Lymphadenopathy:      Cervical: No cervical adenopathy. Skin:     General: Skin is warm. Findings: No erythema or rash. Neurological:      Mental Status: She is alert and oriented to person, place, and time. Psychiatric:         Mood and Affect: Mood normal.         Behavior: Behavior normal.         Thought Content: Thought content normal.         Judgment: Judgment normal.          Breasts: breasts appear normal, no suspicious masses, no skin or nipple changes or axillary nodes, symmetric fibrous changes in both upper outer quadrants.     vulva: normal external genitalia for age and no lesions, masses, epithelial changes, or exudate  vagina: color pink and rugae  flattening of rugae  cervix: parous and no lesions   uterus: NSSC, AF, NT, mobile  adnexa: limited by habitus and no masses or tenderness  rectum: no masses or nodularity    A/P:  Pt is a 48 y.o.  with       Johanna was seen today for gynecologic exam.    Diagnoses and all orders for this visit:    Encounter for annual routine gynecological examination  -stable examination  -pap up to date    Encounter for screening mammogram for breast cancer  -     Mammo screening bilateral w 3d & cad; Future    Colon cancer screening  -     Ambulatory referral to Gastroenterology; Future    Inadequate exercise  Patient advised recommendation of exercise 5 times per week for 30 minutes. BMI 36.0-36.9,adult  Patient advised recommendation of BMI to be between 19-25.

## 2023-10-31 ENCOUNTER — TELEPHONE (OUTPATIENT)
Age: 53
End: 2023-10-31

## 2023-10-31 NOTE — TELEPHONE ENCOUNTER
10/31/23  Screened by: Aisha Jesus    Referring Provider Dr. Castorena    Pre- Screening:     There is no height or weight on file to calculate BMI.  Has patient been referred for a routine screening Colonoscopy? yes  Is the patient between 45-75 years old? yes      Previous Colonoscopy yes   If yes:    Date: 2018-?    Facility: [facility]    Reason: ABDOMEN ISSUES      SCHEDULING STAFF: If the patient is between 45yrs-49yrs, please advise patient to confirm benefits/coverage with their insurance company for a routine screening colonoscopy, some insurance carriers will only cover at 50yrs or older. If the patient is over 75years old, please schedule an office visit.     Does the patient want to see a Gastroenterologist prior to their procedure OR are they having any GI symptoms? no    Has the patient been hospitalized or had abdominal surgery in the past 6 months? no    Does the patient use supplemental oxygen? no    Does the patient take Coumadin, Lovenox, Plavix, Elliquis, Xarelto, or other blood thinning medication? no    Has the patient had a stroke, cardiac event, or stent placed in the past year? no      PASSED OA    SCHEDULING STAFF: If patient answers NO to above questions, then schedule procedure. If patient answers YES to above questions, then schedule office appointment.     If patient is between 45yrs - 49yrs, please advise patient that we will have to confirm benefits & coverage with their insurance company for a routine screening colonoscopy.

## 2023-10-31 NOTE — TELEPHONE ENCOUNTER
ASC Screening    ASC Screening  BMI > than 45: No  Are you currently pregnant?: No  Do you rely on a wheelchair for mobility?: No  Do you need oxygen during the day?: No  Have you ever been informed by anesthesia that you have a difficult airway?: No  Have you been diagnosed with End Stage Renal Disease (ESRD)?: No  Are you actively on dialysis?: No  Have you been diagnosed with Pulmonary Hypertension?: No  Do you have a pacemaker or an Automatic Implantable Cardioverter Defibrillator (AICD)?: No  Have you ever had an organ transplant?: No  Have you had a stroke, heart attack, myocardial infarction (MI) within the last 6 months?: No  Have you ever been diagnosed with Aortic Stenosis?: No  Have you ever been diagnosed  with Congestive Heart Failure?: No  Have you ever been diagnosed with a heart valve disease?: No  Are you Diabetic?: No  If you are Diabetic, has your A1C been greater than 12 within the last six months?: N/A

## 2023-12-26 ENCOUNTER — HOSPITAL ENCOUNTER (OUTPATIENT)
Dept: MAMMOGRAPHY | Facility: HOSPITAL | Age: 53
Discharge: HOME | End: 2023-12-26
Payer: COMMERCIAL

## 2023-12-26 DIAGNOSIS — Z12.31 ENCOUNTER FOR SCREENING MAMMOGRAM FOR BREAST CANCER: ICD-10-CM

## 2023-12-26 PROCEDURE — 77063 BREAST TOMOSYNTHESIS BI: CPT

## 2023-12-26 PROCEDURE — 77067 SCR MAMMO BI INCL CAD: CPT

## 2024-01-09 ENCOUNTER — TELEPHONE (OUTPATIENT)
Age: 54
End: 2024-01-09

## 2024-01-09 NOTE — TELEPHONE ENCOUNTER
Patients GI provider:  Dr. Greco    Number to return call: [phone]     Reason for call: Pt called and wants to be rescheduled only with Dr Greco on July 15, please review and let her know if she is able to be scheduled with him on July 15? Thank you     Scheduled procedure/appointment date if applicable: n/a

## 2024-01-09 NOTE — TELEPHONE ENCOUNTER
Dr. Greco available for advance procedures only on July 15th.     Spoke with pt, we were unable to arrive at a date w/ Dr. Greco that works within the pt's schedule.  She states she will call at a later time in anticipation of Dr. Greco's schedule opening through the end of the year.

## 2024-01-12 ENCOUNTER — TELEPHONE (OUTPATIENT)
Dept: GASTROENTEROLOGY | Facility: MEDICAL CENTER | Age: 54
End: 2024-01-12

## 2024-02-21 PROBLEM — Z12.11 COLON CANCER SCREENING: Status: RESOLVED | Noted: 2020-08-13 | Resolved: 2024-02-21

## 2024-02-21 PROBLEM — Z01.419 ENCOUNTER FOR ANNUAL ROUTINE GYNECOLOGICAL EXAMINATION: Status: RESOLVED | Noted: 2019-01-28 | Resolved: 2024-02-21

## 2024-10-08 ENCOUNTER — ANNUAL EXAM (OUTPATIENT)
Dept: OBGYN CLINIC | Facility: CLINIC | Age: 54
End: 2024-10-08
Payer: COMMERCIAL

## 2024-10-08 VITALS
HEART RATE: 82 BPM | DIASTOLIC BLOOD PRESSURE: 80 MMHG | SYSTOLIC BLOOD PRESSURE: 126 MMHG | WEIGHT: 203.2 LBS | OXYGEN SATURATION: 99 % | HEIGHT: 63 IN | BODY MASS INDEX: 36 KG/M2

## 2024-10-08 DIAGNOSIS — Z12.31 ENCOUNTER FOR SCREENING MAMMOGRAM FOR BREAST CANCER: ICD-10-CM

## 2024-10-08 DIAGNOSIS — Z72.3 INADEQUATE EXERCISE: ICD-10-CM

## 2024-10-08 DIAGNOSIS — Z12.11 COLON CANCER SCREENING: ICD-10-CM

## 2024-10-08 DIAGNOSIS — Z01.419 ENCOUNTER FOR ANNUAL ROUTINE GYNECOLOGICAL EXAMINATION: Primary | ICD-10-CM

## 2024-10-08 PROCEDURE — S0612 ANNUAL GYNECOLOGICAL EXAMINA: HCPCS | Performed by: OBSTETRICS & GYNECOLOGY

## 2024-10-08 RX ORDER — FENOFIBRATE 145 MG/1
145 TABLET, COATED ORAL DAILY
COMMUNITY
Start: 2024-09-27 | End: 2025-09-27

## 2024-10-08 NOTE — PROGRESS NOTES
Pt is a54 y.o.  ,menopausal, who presents for preventive care  Partner same ; lifetime   5          safe sexual practices followed: yes     does feel safe in relationship:yes    Dairy: 2 c milk daily, 2 cheese daily, 1 yogurt 4x/week  Vitamin D: takes supplement  Exercise: walking 15 minutes, 3x/week  Pap: 2022-wnl, HRHPV neg repeat   Mammogram: 2023-wnl, repeat rx for 1 year given  Colonoscopy: 2019-benign polyps, was due for repeat . Referral was given last year. Pt reports she was unable to schedule due to her father's health who recently passed away. Repeat referral given  DEXA: not indicated  safety at home:  yes  tobacco use No    Past Medical History:   Diagnosis Date    Abnormal Pap smear of cervix     ; wnl since; 3/2016-wnl, HPV neg; 2019 wnl, HPV neg; 2022-wnl, HRHPV neg    Asthma     COVID-19     2020    COVID-19 vaccine series completed     J&J-3/19/2021    H. pylori infection     HPV (human papilloma virus) infection     Migraine     Varicella        Past Surgical History:   Procedure Laterality Date    COLONOSCOPY      2019-benign polyps; 2024    WISDOM TOOTH EXTRACTION         Ob Hx:   OB History    Para Term  AB Living   1 1 0 1   1   SAB IAB Ectopic Multiple Live Births                  # Outcome Date GA Lbr Dinesh/2nd Weight Sex Type Anes PTL Lv   1                Obstetric Comments   35 week , PPROM      Menarche: 14      Menses: LMP 10/2020           Current Outpatient Medications:     cetirizine (ZyrTEC) 10 mg tablet, Take 10 mg by mouth, Disp: , Rfl:     Cholecalciferol 50 MCG ( [place]) CAPS, Take 1 capsule by mouth, Disp: , Rfl:     fenofibrate (TRICOR) 145 mg tablet, Take 145 mg by mouth daily, Disp: , Rfl:     albuterol (PROVENTIL HFA,VENTOLIN HFA) 90 mcg/act inhaler, Inhale 2 puffs every 4 (four) hours as needed (Patient not taking: Reported on 10/8/2024), Disp: , Rfl:     Allergies   Allergen Reactions    Erythromycin Other  (See Comments)    Nitrofurantoin Nausea Only, Vomiting and Hives     N/V    Pollen Extract     Penicillins Rash       Social History     Socioeconomic History    Marital status: /Civil Union     Spouse name: Suman    Number of children: 1    Years of education: bachelors    Highest education level: None   Occupational History    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 [employer]   Tobacco Use    Smoking status: Never    Smokeless tobacco: Never   Vaping Use    Vaping status: Never Used   Substance and Sexual Activity    Alcohol use: Yes     Alcohol/week: 2.0 standard drinks of alcohol     Types: 2 Glasses of wine per week     Comment: Occasionally    Drug use: No    Sexual activity: Yes     Partners: Male     Birth control/protection: Male Sterilization, Post-menopausal     Comment: lifetime partners: 5;  2013   Other Topics Concern    None   Social History Narrative    Christian: No preference    Accepts blood products        Exercise: 15 minutes 3x/week walking    Calcium: 2 c milk daily, 2 cheese daily, 1 yogurt 4x/week     Social Determinants of Health     Financial Resource Strain: Low Risk  (9/23/2024)    Received from [facility]    Overall Financial Resource Strain (CARDIA)     Difficulty of Paying Living Expenses: Not very hard   Food Insecurity: No Food Insecurity (9/23/2024)    Received from [facility]    Hunger Vital Sign     Worried About Running Out of Food in the Last Year: Never true     Ran Out of Food in the Last Year: Never true   Transportation Needs: No Transportation Needs (9/23/2024)    Received from [facility]    PRAPARE - Transportation     Lack of Transportation (Medical): No     Lack of Transportation (Non-Medical): No   Physical Activity: Not on file   Stress: No Stress Concern Present (9/23/2024)    Received from [facility]    [employer] - Occupational Stress Questionnaire  "    Feeling of Stress : Only a little   Social Connections: Feeling Socially Integrated (2024)    Received from [facility]    OASIS : Social Isolation     How often do you feel lonely or isolated from those around you?: Rarely   Intimate Partner Violence: Not At Risk (2024)    Received from [facility]    Humiliation, Afraid, Rape, and Kick questionnaire     Fear of Current or Ex-Partner: No     Emotionally Abused: No     Physically Abused: No     Sexually Abused: No   Housing Stability: Unknown (2024)    Received from [facility]    Housing Stability Vital Sign     Unable to Pay for Housing in the Last Year: No     Number of Times Moved in the Last Year: Not on file     Homeless in the Last Year: No       Family History   Problem Relation Age of Onset    Breast cancer Mother 65    Obesity Mother     Diabetes Mother     COPD Mother     Prostate cancer Father 58    Cancer Father 61        bladder    Hyperthyroidism Father     Other Father 75        Sarcoma in back    Heart disease Maternal Grandmother     Diabetes Maternal Grandmother     Heart disease Maternal Grandfather     Cervical cancer Paternal Grandmother         age at onset unknown    Ovarian cancer Paternal Grandmother         age at onset unknown,  28?    Lung cancer Paternal Grandfather 80    No Known Problems Maternal Aunt     No Known Problems Maternal Aunt     No Known Problems Paternal Aunt     No Known Problems Paternal Aunt     Colon cancer Neg Hx        Blood pressure 126/80, pulse 82, height 5' 3\" (1.6 m), weight 92.2 kg (203 lb 3.2 oz), SpO2 99%. and Body mass index is 36 kg/m².    Physical Exam  Constitutional:       General: She is not in acute distress.     Appearance: Normal appearance. She is well-developed. She is obese.   HENT:      Head: Normocephalic and atraumatic.   Eyes:      Extraocular Movements: Extraocular movements intact.      Conjunctiva/sclera: " Conjunctivae normal.   Neck:      Thyroid: No thyromegaly.      Trachea: No tracheal deviation.   Cardiovascular:      Rate and Rhythm: Normal rate and regular rhythm.      Heart sounds: Normal heart sounds.   Pulmonary:      Effort: Pulmonary effort is normal. No respiratory distress.      Breath sounds: Normal breath sounds. No stridor. No wheezing or rales.   Abdominal:      General: Bowel sounds are normal. There is no distension.      Palpations: Abdomen is soft. There is no mass.      Tenderness: There is no abdominal tenderness. There is no guarding or rebound.      Hernia: No hernia is present.   Musculoskeletal:         General: No tenderness. Normal range of motion.      Cervical back: Normal range of motion and neck supple.   Lymphadenopathy:      Cervical: No cervical adenopathy.   Skin:     General: Skin is warm.      Findings: No erythema or rash.   Neurological:      Mental Status: She is alert and oriented to person, place, and time.   Psychiatric:         Mood and Affect: Mood normal.         Behavior: Behavior normal.         Thought Content: Thought content normal.         Judgment: Judgment normal.          Breasts: breasts appear normal, no suspicious masses, no skin or nipple changes or axillary nodes, symmetric fibrous changes in both upper outer quadrants.      vulva: normal external genitalia for age and no lesions, masses, epithelial changes, or exudate  vagina: color pink and rugae  well formed rugae  cervix: parous and no lesions   uterus: NSSC, AF, NT, mobile  adnexa: no masses or tenderness  rectum: no masses or nodularity    A/P:  Pt is a 54 y.o.  with       Johanna was seen today for gynecologic exam.    Diagnoses and all orders for this visit:    Encounter for annual routine gynecological examination  -stable examination  -pap up to date    Encounter for screening mammogram for breast cancer  -     Mammo screening bilateral w 3d and cad; Future    Colon cancer screening  -      Ambulatory referral to Gastroenterology; Future    Inadequate exercise  Patient advised recommendation of exercise 5 times per week for 30 minutes.     BMI 36.0-36.9,adult  Patient advised recommendation of BMI to be between 19-25.

## 2024-12-10 ENCOUNTER — TELEPHONE (OUTPATIENT)
Dept: GASTROENTEROLOGY | Facility: CLINIC | Age: 54
End: 2024-12-10

## 2024-12-10 NOTE — TELEPHONE ENCOUNTER
Call disconnected before scheduling, bad connection     12/10/24  Screened by: Reynadlo Garay MA     Referring Provider DR SUTTON     Pre- Screening:      Body mass index is 36 kg/m².  Has patient been referred for a routine screening Colonoscopy? yes  Is the patient between 45-75 years old? yes        Previous Colonoscopy yes   If yes:    Date:     Facility:     Reason:         SCHEDULING STAFF: If the patient is between 45yrs-49yrs, please advise patient to confirm benefits/coverage with their insurance company for a routine screening colonoscopy, some insurance carriers will only cover at 50yrs or older. If the patient is over 75years old, please schedule an office visit.      Does the patient want to see a Gastroenterologist prior to their procedure OR are they having any GI symptoms? no     Has the patient been hospitalized or had abdominal surgery in the past 6 months? no     Does the patient use supplemental oxygen? no     Does the patient take Coumadin, Lovenox, Plavix, Elliquis, Xarelto, or other blood thinning medication? no     Has the patient had a stroke, cardiac event, or stent placed in the past year? no     SCHEDULING STAFF: If patient answers NO to above questions, then schedule procedure. If patient answers YES to above questions, then schedule office appointment.      If patient is between 45yrs - 49yrs, please advise patient that we will have to confirm benefits & coverage with their insurance company for a routine screening colonoscopy.    Close

## 2025-02-18 ENCOUNTER — HOSPITAL ENCOUNTER (OUTPATIENT)
Dept: MAMMOGRAPHY | Facility: MEDICAL CENTER | Age: 55
Discharge: HOME | End: 2025-02-18
Payer: COMMERCIAL

## 2025-02-18 VITALS — WEIGHT: 203.26 LBS | BODY MASS INDEX: 36.02 KG/M2 | HEIGHT: 63 IN

## 2025-02-18 DIAGNOSIS — Z12.31 ENCOUNTER FOR SCREENING MAMMOGRAM FOR BREAST CANCER: ICD-10-CM

## 2025-02-18 PROCEDURE — 77063 BREAST TOMOSYNTHESIS BI: CPT

## 2025-02-18 PROCEDURE — 77067 SCR MAMMO BI INCL CAD: CPT
